# Patient Record
Sex: FEMALE | Race: WHITE | Employment: FULL TIME
[De-identification: names, ages, dates, MRNs, and addresses within clinical notes are randomized per-mention and may not be internally consistent; named-entity substitution may affect disease eponyms.]

---

## 2021-11-17 ENCOUNTER — NURSE TRIAGE (OUTPATIENT)
Dept: OTHER | Facility: CLINIC | Age: 41
End: 2021-11-17

## 2021-11-18 NOTE — TELEPHONE ENCOUNTER
Reason for Disposition   Sounds like a serious injury to the triager    Answer Assessment - Initial Assessment Questions  1. MECHANISM: \"How did the injury happen? \"      Pt reports that she pulled a pan out of the oven and forgot that she didn't have a pot dickens. Bare-handed a 400 degree pan    2. ONSET: \"When did the injury happen? \" (Minutes or hours ago)       11/17/21 @ about 1920    3. APPEARANCE of INJURY: \"What does the injury look like? \"       Does not look red or swollen - there are spots that are turning white    4. SEVERITY: \"Can you use the hand normally? \" \"Can you bend your fingers into a ball and then fully open them? \"      Pt is able to bend the fingers    5. SIZE: For cuts, bruises, or swelling, ask: \"How large is it? \" (e.g., inches or centimeters;  entire hand or wrist)       No opened skin    6. PAIN: \"Is there pain? \" If so, ask: \"How bad is the pain? \"  (Scale 1-10; or mild, moderate, severe)      Pt is presently running cold water on her hand and it isn't painful. She took it out from under the cold running water and it was instantly painful    7. TETANUS: For any breaks in the skin, ask: \"When was the last tetanus booster? \"     N/a    8. OTHER SYMPTOMS: \"Do you have any other symptoms? \"      None    9. PREGNANCY: \"Is there any chance you are pregnant? \" \"When was your last menstrual period? \"    no    Protocols used: HAND AND WRIST INJURY-ADULT-    Brief description of triage: pt reports that she forgot to  a pot dickens and reached into a 400 degree oven and took out a pan    Triage indicates for patient to go to the ED now    Care advice provided, patient verbalizes understanding; denies any other questions or concerns; instructed to call back for any new or worsening symptoms. This triage is a result of a call to [address] [place] [address]. Please do not respond to the triage nurse through this encounter.  Any subsequent communication should be directly with the patient.